# Patient Record
Sex: FEMALE | Race: WHITE | URBAN - METROPOLITAN AREA
[De-identification: names, ages, dates, MRNs, and addresses within clinical notes are randomized per-mention and may not be internally consistent; named-entity substitution may affect disease eponyms.]

---

## 2020-05-07 LAB — SARS-COV-2, NAA: NOT DETECTED

## 2020-05-11 ENCOUNTER — HOSPITAL ENCOUNTER (OUTPATIENT)
Dept: INTERVENTIONAL RADIOLOGY/VASCULAR | Age: 50
Discharge: HOME | End: 2020-05-11
Attending: FAMILY MEDICINE
Payer: COMMERCIAL

## 2020-05-11 VITALS
RESPIRATION RATE: 18 BRPM | HEART RATE: 58 BPM | DIASTOLIC BLOOD PRESSURE: 77 MMHG | OXYGEN SATURATION: 95 % | SYSTOLIC BLOOD PRESSURE: 140 MMHG

## 2020-05-11 DIAGNOSIS — M51.36 DDD (DEGENERATIVE DISC DISEASE), LUMBAR: ICD-10-CM

## 2020-05-11 PROCEDURE — 62323 NJX INTERLAMINAR LMBR/SAC: CPT

## 2020-05-11 PROCEDURE — 77030003666 HC NDL SPINAL BD -A

## 2020-05-11 PROCEDURE — 74011000250 HC RX REV CODE- 250: Performed by: RADIOLOGY

## 2020-05-11 PROCEDURE — 74011250636 HC RX REV CODE- 250/636: Performed by: RADIOLOGY

## 2020-05-11 PROCEDURE — 74011636320 HC RX REV CODE- 636/320: Performed by: RADIOLOGY

## 2020-05-11 RX ORDER — CARISOPRODOL 350 MG/1
350 TABLET ORAL 4 TIMES DAILY
COMMUNITY

## 2020-05-11 RX ORDER — OXYCODONE HYDROCHLORIDE 20 MG/1
20 TABLET ORAL 3 TIMES DAILY
COMMUNITY

## 2020-05-11 RX ORDER — METHYLPREDNISOLONE ACETATE 40 MG/ML
80 INJECTION, SUSPENSION INTRA-ARTICULAR; INTRALESIONAL; INTRAMUSCULAR; SOFT TISSUE ONCE
Status: COMPLETED | OUTPATIENT
Start: 2020-05-11 | End: 2020-05-11

## 2020-05-11 RX ORDER — LIDOCAINE HYDROCHLORIDE 20 MG/ML
1-10 INJECTION, SOLUTION EPIDURAL; INFILTRATION; INTRACAUDAL; PERINEURAL
Status: DISCONTINUED | OUTPATIENT
Start: 2020-05-11 | End: 2020-05-11 | Stop reason: ALTCHOICE

## 2020-05-11 RX ORDER — NEBIVOLOL 10 MG/1
10 TABLET ORAL AS NEEDED
COMMUNITY

## 2020-05-11 RX ADMIN — LIDOCAINE HYDROCHLORIDE 100 MG: 20 INJECTION, SOLUTION EPIDURAL; INFILTRATION; INTRACAUDAL; PERINEURAL at 10:32

## 2020-05-11 RX ADMIN — METHYLPREDNISOLONE ACETATE 80 MG: 40 INJECTION, SUSPENSION INTRA-ARTICULAR; INTRALESIONAL; INTRAMUSCULAR; SOFT TISSUE at 10:45

## 2020-05-11 RX ADMIN — IOPAMIDOL 1 ML: 408 INJECTION, SOLUTION INTRATHECAL at 10:48

## 2020-05-11 NOTE — PROCEDURES
Department of Interventional Radiology  [phone]        Interventional Radiology Brief Procedure Note    Patient: Hope Opitz MRN: [id number]  SSN: [id number]    YOB: 1970  Age: 52 y.o. Sex: female      Date of Procedure: 5/11/2020    Pre-Procedure Diagnosis: Back pain    Post-Procedure Diagnosis: SAME    Procedure(s): Epidural Steroid Injection    Brief Description of Procedure: as above    Performed By: Magaly Patterson MD     Assistants: None    Anesthesia:Lidocaine    Estimated Blood Loss: Less than 10ml    Specimens:  None    Implants:  None    Findings: severe ddd and djd at l5/s1.   Injected at M4/3    Complications: None    Recommendations: routine post care     Follow Up: as needed    Signed By: Magaly Patterson MD     May 11, 2020

## 2020-05-11 NOTE — PROGRESS NOTES
TRANSFER - OUT REPORT:    Verbal report given to Rina Abraham, RNs (name) on [facility] Neighbours  being transferred to IR recovery (unit) for routine progression of care       Report consisted of patients Situation, Background, Assessment and   Recommendations(SBAR). Information from the following report(s) Procedure Summary and MAR was reviewed with the receiving nurse. Lines:       Opportunity for questions and clarification was provided.       Patient transported with:   Registered Nurse

## 2020-05-11 NOTE — DISCHARGE INSTRUCTIONS
[address]  Department of Interventional Radiology  [facility]  [phone] Office  [phone] Fax    Steroid Injection Discharge Instructions    General Information:   A steroid injection was performed today, placing a combination of a steroid and an anesthetic (numbing medicine) into the space around the nerves of your spine. This is done to treat back pain. It may take 7-10 days for the injection to reach its full potential.  This procedure can be done at any level of the spinal column, depending on where your pain is. Your doctor will have ordered the appropriate level to be treated prior to your coming in for the procedure. Home Care Instructions: You can resume your regular diet. Do not drink alcohol. You may notice that you have to use your pain medications less after your injection. Some people do not notice much of a change in their pain after the first injection. If that is the case, it is worth your time to have a second one done. This is why these injections are sometimes ordered in a series of three. Keep the puncture site clean and dry for 24 hours, and then you may remove the dressing. Showering is acceptable after the bandage is removed. Call If:   You should call your Physician and/or the Radiology Nurse if you have any bleeding other than a small spot on your bandage. Call if you have any signs of infection, fever, increased pain at the puncture site, confusion, or a headache that worsens when you stand and eases when lying flat. Follow-Up Instructions:  Please see your ordering doctor as he/she has requested. Let your doctor know if you have relief from your pain so they may schedule another injection for you if it is indicated. To Reach Us: If you have any questions about your procedure, please call the Interventional Radiology department at [phone].  After business hours (5pm) and weekends, call the answering service at [phone] and ask for the Radiologist on call to be paged. Si tiene Preguntas acerca del procedimiento, por favor llame al departamento de Radiología Intervencional al [phone]. Después de horas de oficina (5 pm) y los fines de [place], llamar al [place] al [phone] y pregunte por el Radiologo de Luc[facility]cut[facility]. Interventional Radiology General Nurse Discharge    After general anesthesia or intravenous sedation, for 24 hours or while taking prescription Narcotics:  · Limit your activities  · Do not drive and operate hazardous machinery  · Do not make important personal or business decisions  · Do  not drink alcoholic beverages  · If you have not urinated within 8 hours after discharge, please contact your surgeon on call. * Please give a list of your current medications to your Primary Care Provider. * Please update this list whenever your medications are discontinued, doses are     changed, or new medications (including over-the-counter products) are added. * Please carry medication information at all times in case of emergency situations. These are general instructions for a healthy lifestyle:    No smoking/ No tobacco products/ Avoid exposure to second hand smoke  Surgeon General's Warning:  Quitting smoking now greatly reduces serious risk to your health. Obesity, smoking, and sedentary lifestyle greatly increases your risk for illness  A healthy diet, regular physical exercise & weight monitoring are important for maintaining a healthy lifestyle    You may be retaining fluid if you have a history of heart failure or if you experience any of the following symptoms:  Weight gain of 3 pounds or more overnight or 5 pounds in a week, increased swelling in our hands or feet or shortness of breath while lying flat in bed. Please call your doctor as soon as you notice any of these symptoms; do not wait until your next office visit.     Recognize signs and symptoms of STROKE:  F-face looks uneven    A-arms unable to move or move unevenly    S-speech slurred or non-existent    T-time-call 911 as soon as signs and symptoms begin-DO NOT go       Back to bed or wait to see if you get better-TIME IS BRAIN.       Patient Signature:  Date: 5/11/2020  Discharging Nurse: Yasir Jasmine RN